# Patient Record
Sex: MALE | Race: WHITE | Employment: FULL TIME | URBAN - METROPOLITAN AREA
[De-identification: names, ages, dates, MRNs, and addresses within clinical notes are randomized per-mention and may not be internally consistent; named-entity substitution may affect disease eponyms.]

---

## 2017-04-25 ENCOUNTER — OFFICE VISIT (OUTPATIENT)
Dept: FAMILY MEDICINE | Facility: CLINIC | Age: 27
End: 2017-04-25
Payer: COMMERCIAL

## 2017-04-25 VITALS
HEART RATE: 89 BPM | DIASTOLIC BLOOD PRESSURE: 82 MMHG | BODY MASS INDEX: 26.39 KG/M2 | TEMPERATURE: 98.5 F | OXYGEN SATURATION: 98 % | HEIGHT: 71 IN | SYSTOLIC BLOOD PRESSURE: 138 MMHG | WEIGHT: 188.5 LBS

## 2017-04-25 DIAGNOSIS — Z00.00 ROUTINE GENERAL MEDICAL EXAMINATION AT A HEALTH CARE FACILITY: Primary | ICD-10-CM

## 2017-04-25 DIAGNOSIS — N52.9 ERECTILE DYSFUNCTION, UNSPECIFIED ERECTILE DYSFUNCTION TYPE: ICD-10-CM

## 2017-04-25 DIAGNOSIS — F41.1 GAD (GENERALIZED ANXIETY DISORDER): ICD-10-CM

## 2017-04-25 PROCEDURE — 99385 PREV VISIT NEW AGE 18-39: CPT | Performed by: NURSE PRACTITIONER

## 2017-04-25 PROCEDURE — 99213 OFFICE O/P EST LOW 20 MIN: CPT | Mod: 25 | Performed by: NURSE PRACTITIONER

## 2017-04-25 RX ORDER — BUSPIRONE HYDROCHLORIDE 10 MG/1
10 TABLET ORAL 2 TIMES DAILY
Qty: 60 TABLET | Refills: 1 | Status: SHIPPED | OUTPATIENT
Start: 2017-04-25 | End: 2017-05-31 | Stop reason: DRUGHIGH

## 2017-04-25 RX ORDER — TADALAFIL 20 MG/1
10-20 TABLET ORAL DAILY PRN
Qty: 6 TABLET | Refills: 11 | Status: SHIPPED | OUTPATIENT
Start: 2017-04-25 | End: 2017-04-25

## 2017-04-25 RX ORDER — TADALAFIL 20 MG/1
10-20 TABLET ORAL DAILY PRN
Qty: 6 TABLET | Refills: 11 | Status: SHIPPED | OUTPATIENT
Start: 2017-04-25 | End: 2021-11-18

## 2017-04-25 ASSESSMENT — ANXIETY QUESTIONNAIRES
5. BEING SO RESTLESS THAT IT IS HARD TO SIT STILL: NOT AT ALL
7. FEELING AFRAID AS IF SOMETHING AWFUL MIGHT HAPPEN: SEVERAL DAYS
IF YOU CHECKED OFF ANY PROBLEMS ON THIS QUESTIONNAIRE, HOW DIFFICULT HAVE THESE PROBLEMS MADE IT FOR YOU TO DO YOUR WORK, TAKE CARE OF THINGS AT HOME, OR GET ALONG WITH OTHER PEOPLE: VERY DIFFICULT
1. FEELING NERVOUS, ANXIOUS, OR ON EDGE: NEARLY EVERY DAY
6. BECOMING EASILY ANNOYED OR IRRITABLE: NEARLY EVERY DAY
2. NOT BEING ABLE TO STOP OR CONTROL WORRYING: NEARLY EVERY DAY
GAD7 TOTAL SCORE: 16
3. WORRYING TOO MUCH ABOUT DIFFERENT THINGS: NEARLY EVERY DAY

## 2017-04-25 ASSESSMENT — PATIENT HEALTH QUESTIONNAIRE - PHQ9: 5. POOR APPETITE OR OVEREATING: NEARLY EVERY DAY

## 2017-04-25 NOTE — PROGRESS NOTES
"  SUBJECTIVE:     CC: Juan Johnson is an 26 year old male who presents for preventative health visit.     Healthy Habits:    Do you get at least three servings of calcium containing foods daily (dairy, green leafy vegetables, etc.)? yes    Amount of exercise or daily activities, outside of work: 2 day(s) per week    Problems taking medications regularly not applicable    Medication side effects: No    Have you had an eye exam in the past two years? yes    Do you see a dentist twice per year? no    Do you have sleep apnea, excessive snoring or daytime drowsiness?yes- some daytime drowsiness     Abnormal Mood Symptoms     Onset: Since the age of 15 or 16    Description:   Depression: YES  Anxiety: YES- panic attacks    Accompanying Signs & Symptoms:  Still participating in activities that you used to enjoy: \"barely\"  Fatigue: YES  Irritability: YES  Difficulty concentrating: YES  Changes in appetite: YES  Problems with sleep: YES- some nights- depending on how the day was.   Heart racing/beating fast : YES- during panic attacks   Thoughts of hurting yourself or others: none     History:   Recent stress: YES  Prior depression hospitalization: None- outpatient program  Family history of depression: YES- older brother  Family history of anxiety: YES- older brother      Precipitating factors:   Alcohol/drug use: no- has tried cutting back on alcohol- did not notice a change in symptoms    Alleviating factors:  None        Therapies Tried and outcome: Celexa (Citalopram), Effexor XR (Venafaxine), Prozac (Fluoxetine) and Zoloft (Sertraline), thinks there were a few other ones but cannot remember names- none helpful. Ativan has been helpful in the past w/ school.             -------------------------------------    Today's PHQ-2 Score: No flowsheet data found.    Abuse: Current or Past(Physical, Sexual or Emotional)- No  Do you feel safe in your environment - Yes    Social History   Substance Use Topics     Smoking " status: Current Every Day Smoker     Packs/day: 0.25     Years: 4.00     Types: Cigarettes     Smokeless tobacco: Not on file     Alcohol use Yes      Comment: about 2 per day or 8 per week          Standardized Alcohol Screening Questionnaire  AUDIT   Questions 0 1 2 3 4 Score   1. How often do you have a drink  containing alcohol? Never Monthly or less 2 to 4  times a  month 2 to 3  times a  week 4 or more  times a  week  3   2. How many drinks containing alcohol  do you have on a typical day when you are drinking? 1 or 2 3 or 4 5 or 6 7 to 9 10 or more  0   3. How often do you have more than five  or more drinks on one occasion? Never Less  than  monthly Monthly Weekly Daily or  almost  daily  1   4. How often during the last year have  you found that you were not able to stop drinking once you had started? Never Less  than  monthly Monthly Weekly Daily or  almost  daily  0   5. How often during the last year have  you failed to do what was normally expected of you because of drinking? Never Less  than  monthly Monthly Weekly Daily or  almost  daily  0   6. How often during the last year have  you needed a first drink in the morning to get yourself going after a heavy drinking session? Never Less  than  monthly Monthly Weekly Daily or  almost  daily  0   7. How often during the last year have you had a feeling of guilt or remorse after drinking? Never Less  than  monthly Monthly Weekly Daily or  almost  daily  1   8. How often during the last year have  you been unable to remember what happened the night before because of your drinking? Never Less  than  monthly Monthly Weekly Daily or  almost  daily  0   9. Have you or someone else been  injured because of your drinking? No  Yes, but not in the last year  Yes,  during the  last year  0   10. Has a relative, friend, doctor or other health care worker been concerned about your drinking or suggested you cut down? No  Yes, but not in the last year  Yes,  during  the  last year  0   Total  5   Scoring: A score of 7 for adult men is an indication of hazardous drinking (risk for physical or physiological harm); a score of 8 or more is an indication of an alcohol use disorder. A score of 5 or more for adult women  is an indication of hazardous drinking or an alcohol use disorder.       Last PSA: No results found for: PSA    No results for input(s): CHOL, HDL, LDL, TRIG, CHOLHDLRATIO, NHDL in the last 98751 hours.    Reviewed orders with patient. Reviewed health maintenance and updated orders accordingly - Yes    Reviewed and updated as needed this visit by clinical staff  Tobacco  Allergies  Meds  Med Hx  Surg Hx  Fam Hx  Soc Hx        Reviewed and updated as needed this visit by Provider            ROS:  C: NEGATIVE for fever, chills, change in weight  I: NEGATIVE for worrisome rashes, moles or lesions  E: NEGATIVE for vision changes or irritation  ENT: NEGATIVE for ear, mouth and throat problems  R: NEGATIVE for significant cough or SOB  CV: NEGATIVE for chest pain, palpitations or peripheral edema  GI: NEGATIVE for nausea, abdominal pain, heartburn, or change in bowel habits   male: negative for dysuria, hematuria, decreased urinary stream, erectile dysfunction, urethral discharge  M: NEGATIVE for significant arthralgias or myalgia  N: NEGATIVE for weakness, dizziness or paresthesias  P: NEGATIVE for changes in mood or affect    Problem list, Medication list, Allergies, and Medical/Social/Surgical histories reviewed in [facility] and updated as appropriate.  Labs reviewed in EPIC  OBJECTIVE:     There were no vitals taken for this visit.  EXAM:  GENERAL: healthy, alert and no distress  HENT: ear canals and TM's normal, nose and mouth without ulcers or lesions  NECK: no adenopathy, no asymmetry, masses, or scars and thyroid normal to palpation  RESP: lungs clear to auscultation - no rales, rhonchi or wheezes  CV: regular rate and rhythm, normal S1 S2, no S3 or S4, no  murmur, click or rub, no peripheral edema and peripheral pulses strong  : normal penis and testicles, no atrophy noted in testicles  ABDOMEN: soft, nontender, no hepatosplenomegaly, no masses and bowel sounds normal  MS: no gross musculoskeletal defects noted, no edema  SKIN: no suspicious lesions or rashes  PSYCH: mentation appears normal, affect normal/bright    ASSESSMENT/PLAN:         ICD-10-CM    1. Routine general medical examination at a health care facility Z00.00    2. LAM (generalized anxiety disorder) F41.1 MENTAL HEALTH REFERRAL     busPIRone (BUSPAR) 10 MG tablet   3. Erectile dysfunction, unspecified erectile dysfunction type N52.9 tadalafil (CIALIS) 20 MG tablet     DISCONTINUED: tadalafil (CIALIS) 20 MG tablet   Will place counseling referral, and start Buspar predominantly for anxiety symptoms. Will give small quantity of cialis to help  With likely transitory ED related to anxiety symptoms, as these are very bothersome for the patient.    COUNSELING:  Reviewed preventive health counseling, as reflected in patient instructions       Regular exercise       Healthy diet/nutrition         reports that he has been smoking Cigarettes.  He has a 1.00 pack-year smoking history. He does not have any smokeless tobacco history on file.    There is no height or weight on file to calculate BMI.   Weight management plan n/a    Counseling Resources:  ATP IV Guidelines  Pooled Cohorts Equation Calculator  FRAX Risk Assessment  ICSI Preventive Guidelines  Dietary Guidelines for Americans, 2010  USDA's MyPlate  ASA Prophylaxis  Lung CA Screening    CARLYLE Rand CNP  [facility]

## 2017-04-25 NOTE — MR AVS SNAPSHOT
After Visit Summary   4/25/2017    Juan Johnson    MRN: [id number]           Patient Information     Date Of Birth          1990        Visit Information        Provider Department      4/25/2017 3:30 PM Cathi Montes De Oca APRN [facility]        Today's Diagnoses     Routine general medical examination at a health care facility    -  1    LAM (generalized anxiety disorder)        Erectile dysfunction, unspecified erectile dysfunction type          Care Instructions      Preventive Health Recommendations  Male Ages 26 - 39    Yearly exam:             See your health care provider every year in order to  o   Review health changes.   o   Discuss preventive care.    o   Review your medicines if your doctor has prescribed any.    You should be tested each year for STDs (sexually transmitted diseases), if you re at risk.     After age 35, talk to your provider about cholesterol testing. If you are at risk for heart disease, have your cholesterol tested at least every 5 years.     If you are at risk for diabetes, you should have a diabetes test (fasting glucose).  Shots: Get a flu shot each year. Get a tetanus shot every 10 years.     Nutrition:    Eat at least 5 servings of fruits and vegetables daily.     Eat whole-grain bread, whole-wheat pasta and brown rice instead of white grains and rice.     Talk to your provider about Calcium and Vitamin D.     Lifestyle    Exercise for at least 150 minutes a week (30 minutes a day, 5 days a week). This will help you control your weight and prevent disease.     Limit alcohol to one drink per day.     No smoking.     Wear sunscreen to prevent skin cancer.     See your dentist every six months for an exam and cleaning.           Follow-ups after your visit        Additional Services     MENTAL HEALTH REFERRAL       Your provider has referred you to: [facility]: [facility] - Counseling (Individual/Couples/Family) - [facility] [phone]   [email or web address]   *Patient will be contacted by [facility] scheduling partner, [facility]), to schedule an appointment.  Patients may also call [facility] to schedule.  [facility]: [facility] - [facility] [phone]   [email or web address]/   *Referral from [facility] Primary Care Provider required - Consultation Model - medication management & future refills will be returned to [facility] PCP upon completion of evaluation  *Please call to schedule an appointment.    All scheduling is subject to the client's specific insurance plan & benefits, provider/location availability, and provider clinical specialities.  Please arrive 15 minutes early for your first appointment and bring your completed paperwork.    Please be aware that coverage of these services is subject to the terms and limitations of your health insurance plan.  Call member services at your health plan with any benefit or coverage questions.                  Who to contact     If you have questions or need follow up information about today's clinic visit or your schedule please contact [facility] directly at [phone].  Normal or non-critical lab and imaging results will be communicated to you by MyChart, letter or phone within 4 business days after the clinic has received the results. If you do not hear from us within 7 days, please contact the clinic through [facility] or phone. If you have a critical or abnormal lab result, we will notify you by phone as soon as possible.  Submit refill requests through [facility] or call your pharmacy and they will forward the refill request to us. Please allow 3 business days for your refill to be completed.          Additional Information About Your Visit        MyChart Information   "   Issue lets you send messages to your doctor, view your test results, renew your prescriptions, schedule appointments and more. To sign up, go to [email or web address] . Click on \"Log in\" on the left side of the screen, which will take you to the Welcome page. Then click on \"Sign up Now\" on the right side of the page.     You will be asked to enter the access code listed below, as well as some personal information. Please follow the directions to create your username and password.     Your access code is: [email or web address]  Expires: 2017  3:00 PM     Your access code will  in 90 days. If you need help or a new code, please call your [facility] clinic or [phone].        Care EveryWhere ID     This is your Care EveryWhere ID. This could be used by other organizations to access your [facility] medical records  [id number]        Your Vitals Were     Pulse Temperature Height Pulse Oximetry BMI (Body Mass Index)       89 98.5  F (36.9  C) (Oral) 5' 11.25\" (1.81 m) 98% 26.11 kg/m2        Blood Pressure from Last 3 Encounters:   17 142/84    Weight from Last 3 Encounters:   17 188 lb 8 oz (85.5 kg)              We Performed the Following     MENTAL HEALTH REFERRAL          Today's Medication Changes          These changes are accurate as of: 17  3:56 PM.  If you have any questions, ask your nurse or doctor.               Start taking these medicines.        Dose/Directions    busPIRone 10 MG tablet   Commonly known as:  BUSPAR   Used for:  LAM (generalized anxiety disorder)   Started by:  Cathi Montes De Oca APRN CNP        Dose:  10 mg   Take 1 tablet (10 mg) by mouth 2 times daily   Quantity:  60 tablet   Refills:  1       tadalafil 20 MG tablet   Commonly known as:  CIALIS   Used for:  Erectile dysfunction, unspecified erectile dysfunction type   Started by:  Cathi Montes De Oca APRN CNP        Dose:  10-20 mg   Take 0.5-1 tablets (10-20 mg) by mouth daily as needed for erectile " dysfunction Never use with nitroglycerin, terazosin or doxazosin.   Quantity:  6 tablet   Refills:  11            Where to get your medicines      These medications were sent to [facility]/pharmacy #[id number] - [address] AT CORNER OF [address] [place] [address]     Phone:  [phone]     busPIRone 10 MG tablet         Call your pharmacy to confirm that your medication is ready for pickup. It may take up to 24 hours for them to receive the prescription. If the prescription is not ready within 3 business days, please contact your clinic or your provider.     We will let you know when these medications are ready. If you don't hear back within 3 business days, please contact us.     tadalafil 20 MG tablet                Primary Care Provider    None Specified       No primary provider on file.        Thank you!     Thank you for choosing [facility]  for your care. Our goal is always to provide you with excellent care. Hearing back from our patients is one way we can continue to improve our services. Please take a few minutes to complete the written survey that you may receive in the mail after your visit with us. Thank you!             Your Updated Medication List - Protect others around you: Learn how to safely use, store and throw away your medicines at www.disposemymeds.org.          This list is accurate as of: 4/25/17  3:56 PM.  Always use your most recent med list.                   Brand Name Dispense Instructions for use    busPIRone 10 MG tablet    BUSPAR    60 tablet    Take 1 tablet (10 mg) by mouth 2 times daily       tadalafil 20 MG tablet    CIALIS    6 tablet    Take 0.5-1 tablets (10-20 mg) by mouth daily as needed for erectile dysfunction Never use with nitroglycerin, terazosin or doxazosin.

## 2017-04-25 NOTE — NURSING NOTE
"Chief Complaint   Patient presents with     Physical     Hypertension     Anxiety       Initial /84 (BP Location: Right arm, Patient Position: Chair, Cuff Size: Adult Large)  Pulse 89  Temp 98.5  F (36.9  C) (Oral)  Ht 5' 11.25\" (1.81 m)  Wt 188 lb 8 oz (85.5 kg)  SpO2 98%  BMI 26.11 kg/m2 Estimated body mass index is 26.11 kg/(m^2) as calculated from the following:    Height as of this encounter: 5' 11.25\" (1.81 m).    Weight as of this encounter: 188 lb 8 oz (85.5 kg).  Medication Reconciliation: complete     Porsche Davalos CMA    "

## 2017-04-26 ASSESSMENT — PATIENT HEALTH QUESTIONNAIRE - PHQ9: SUM OF ALL RESPONSES TO PHQ QUESTIONS 1-9: 19

## 2017-04-26 ASSESSMENT — ANXIETY QUESTIONNAIRES: GAD7 TOTAL SCORE: 16

## 2017-05-16 ENCOUNTER — OFFICE VISIT (OUTPATIENT)
Dept: FAMILY MEDICINE | Facility: CLINIC | Age: 27
End: 2017-05-16
Payer: COMMERCIAL

## 2017-05-16 VITALS
TEMPERATURE: 98.1 F | WEIGHT: 185.8 LBS | BODY MASS INDEX: 25.73 KG/M2 | SYSTOLIC BLOOD PRESSURE: 132 MMHG | DIASTOLIC BLOOD PRESSURE: 86 MMHG | OXYGEN SATURATION: 97 % | HEART RATE: 74 BPM

## 2017-05-16 DIAGNOSIS — F41.1 GAD (GENERALIZED ANXIETY DISORDER): Primary | ICD-10-CM

## 2017-05-16 PROCEDURE — 99214 OFFICE O/P EST MOD 30 MIN: CPT | Performed by: NURSE PRACTITIONER

## 2017-05-16 RX ORDER — BUSPIRONE HYDROCHLORIDE 15 MG/1
15 TABLET ORAL 2 TIMES DAILY
Qty: 60 TABLET | Refills: 3 | Status: SHIPPED | OUTPATIENT
Start: 2017-05-16 | End: 2021-11-18

## 2017-05-16 RX ORDER — GABAPENTIN 100 MG/1
100 CAPSULE ORAL 3 TIMES DAILY
Qty: 45 CAPSULE | Refills: 1 | Status: SHIPPED | OUTPATIENT
Start: 2017-05-16 | End: 2021-11-18

## 2017-05-16 NOTE — NURSING NOTE
"Chief Complaint   Patient presents with     RECHECK       Initial /86 (BP Location: Right arm, Patient Position: Chair, Cuff Size: Adult Large)  Pulse 74  Temp 98.1  F (36.7  C) (Oral)  Wt 185 lb 12.8 oz (84.3 kg)  SpO2 97%  BMI 25.73 kg/m2 Estimated body mass index is 25.73 kg/(m^2) as calculated from the following:    Height as of 4/25/17: 5' 11.25\" (1.81 m).    Weight as of this encounter: 185 lb 12.8 oz (84.3 kg).  Medication Reconciliation: complete     Porsche Davalos CMA    "

## 2017-05-16 NOTE — MR AVS SNAPSHOT
"              After Visit Summary   5/16/2017    Juan Johnson    MRN: [id number]           Patient Information     Date Of Birth          1990        Visit Information        Provider Department      5/16/2017 3:00 PM Cathi Montes De Oca APRN [facility]        Today's Diagnoses     LAM (generalized anxiety disorder)    -  1       Follow-ups after your visit        Your next 10 appointments already scheduled     May 17, 2017  3:00 PM CDT   New Visit with MARIAN Patel   [facility]    [address] [place] [address] [phone]            May 31, 2017  2:45 PM CDT   New Visit with CARLYLE Correa [facility]    [address] [facility] [address] [address] [phone]              Who to contact     If you have questions or need follow up information about today's clinic visit or your schedule please contact [facility] directly at [phone].  Normal or non-critical lab and imaging results will be communicated to you by [facility], letter or phone within 4 business days after the clinic has received the results. If you do not hear from us within 7 days, please contact the clinic through [facility] or phone. If you have a critical or abnormal lab result, we will notify you by phone as soon as possible.  Submit refill requests through [facility] or call your pharmacy and they will forward the refill request to us. Please allow 3 business days for your refill to be completed.          Additional Information About Your Visit        MyChart Information     [facility] lets you send messages to your doctor, view your test results, renew your prescriptions, schedule appointments and more. To sign up, go to [email or web address] . Click on \"Log in\" on the left side of the screen, which will take you to the Welcome page. Then click on " "\"Sign up Now\" on the right side of the page.     You will be asked to enter the access code listed below, as well as some personal information. Please follow the directions to create your username and password.     Your access code is: [email or web address]  Expires: 2017  3:00 PM     Your access code will  in 90 days. If you need help or a new code, please call your [place] clinic or [phone].        Care EveryWhere ID     This is your Care EveryWhere ID. This could be used by other organizations to access your [place] medical records  [id number]        Your Vitals Were     Pulse Temperature Pulse Oximetry BMI (Body Mass Index)          74 98.1  F (36.7  C) (Oral) 97% 25.73 kg/m2         Blood Pressure from Last 3 Encounters:   17 132/86   17 138/82    Weight from Last 3 Encounters:   17 185 lb 12.8 oz (84.3 kg)   17 188 lb 8 oz (85.5 kg)              Today, you had the following     No orders found for display         Today's Medication Changes          These changes are accurate as of: 17  3:12 PM.  If you have any questions, ask your nurse or doctor.               Start taking these medicines.        Dose/Directions    gabapentin 100 MG capsule   Commonly known as:  NEURONTIN   Used for:  LAM (generalized anxiety disorder)        Dose:  100 mg   Take 1 capsule (100 mg) by mouth 3 times daily   Quantity:  45 capsule   Refills:  1         These medicines have changed or have updated prescriptions.        Dose/Directions    * busPIRone 10 MG tablet   Commonly known as:  BUSPAR   This may have changed:  Another medication with the same name was added. Make sure you understand how and when to take each.   Used for:  LAM (generalized anxiety disorder)        Dose:  10 mg   Take 1 tablet (10 mg) by mouth 2 times daily   Quantity:  60 tablet   Refills:  1       * busPIRone 15 MG tablet   Commonly known as:  BUSPAR   This may have changed:  You were already taking a medication with " the same name, and this prescription was added. Make sure you understand how and when to take each.   Used for:  LAM (generalized anxiety disorder)        Dose:  15 mg   Take 1 tablet (15 mg) by mouth 2 times daily   Quantity:  60 tablet   Refills:  3       * Notice:  This list has 2 medication(s) that are the same as other medications prescribed for you. Read the directions carefully, and ask your doctor or other care provider to review them with you.         Where to get your medicines      These medications were sent to [facility]/pharmacy #[id number] - [address] AT COR[address] OF [address] [place] [address]     Phone:  [phone]     busPIRone 15 MG tablet    gabapentin 100 MG capsule                Primary Care Provider    None Specified       No primary provider on file.        Thank you!     Thank you for choosing [facility]  for your care. Our goal is always to provide you with excellent care. Hearing back from our patients is one way we can continue to improve our services. Please take a few minutes to complete the written survey that you may receive in the mail after your visit with us. Thank you!             Your Updated Medication List - Protect others around you: Learn how to safely use, store and throw away your medicines at www.disposemymeds.org.          This list is accurate as of: 5/16/17  3:12 PM.  Always use your most recent med list.                   Brand Name Dispense Instructions for use    * busPIRone 10 MG tablet    BUSPAR    60 tablet    Take 1 tablet (10 mg) by mouth 2 times daily       * busPIRone 15 MG tablet    BUSPAR    60 tablet    Take 1 tablet (15 mg) by mouth 2 times daily       gabapentin 100 MG capsule    NEURONTIN    45 capsule    Take 1 capsule (100 mg) by mouth 3 times daily       tadalafil 20 MG tablet    CIALIS    6 tablet    Take 0.5-1 tablets (10-20 mg) by mouth daily as needed for erectile dysfunction Never use with  nitroglycerin, terazosin or doxazosin.       * Notice:  This list has 2 medication(s) that are the same as other medications prescribed for you. Read the directions carefully, and ask your doctor or other care provider to review them with you.

## 2017-05-16 NOTE — PROGRESS NOTES
SUBJECTIVE:                                                    Juan Johnson is a 26 year old male who presents to clinic today for the following health issues:    Anxiety Follow-Up    Status since last visit: Symptoms are the same as last visit, sleeping a bit better, not waking up as frequently, has occasional panic attacks    Other associated symptoms: None    Complicating factors:   Significant life event: Yes-  Grandmother  about 2 months ago, still trying to figure out his next career step when his [employer] program ends.    Current substance abuse: None  Depression symptoms: Yes  LAM-7 SCORE 2017   Total Score 16        GAD7       Amount of exercise or physical activity: 2-3 days/week for an average of 30-60 minutes    Problems taking medications regularly: No    Medication side effects: upset stomach and headache at first    Diet: regular (no restrictions)      -------------------------------------    Problem list and histories reviewed & adjusted, as indicated.  Additional history: as documented    There is no problem list on file for this patient.    No past surgical history on file.    Social History   Substance Use Topics     Smoking status: Current Every Day Smoker     Packs/day: 0.25     Years: 4.00     Types: Cigarettes     Smokeless tobacco: Not on file     Alcohol use Yes      Comment: about 2 per day or 8 per week     Family History   Problem Relation Age of Onset     Depression Brother      Anxiety Disorder Brother            Reviewed and updated as needed this visit by clinical staff  Allergies  Meds       Reviewed and updated as needed this visit by Provider         ROS:  Constitutional, HEENT, cardiovascular, pulmonary, gi and gu systems are negative, except as otherwise noted.    OBJECTIVE:                                                    /86 (BP Location: Right arm, Patient Position: Chair, Cuff Size: Adult Large)  Pulse 74  Temp 98.1  F (36.7  C) (Oral)  Wt  185 lb 12.8 oz (84.3 kg)  SpO2 97%  BMI 25.73 kg/m2  Body mass index is 25.73 kg/(m^2).  GENERAL: healthy, alert and no distress  NECK: no adenopathy, no asymmetry, masses, or scars and thyroid normal to palpation  RESP: lungs clear to auscultation - no rales, rhonchi or wheezes  CV: regular rate and rhythm, normal S1 S2, no S3 or S4, no murmur, click or rub, no peripheral edema and peripheral pulses strong  MS: no gross musculoskeletal defects noted, no edema  PSYCH: mentation appears normal, affect normal/bright    Diagnostic Test Results:  none      ASSESSMENT/PLAN:                                                      Problem List Items Addressed This Visit     None      Visit Diagnoses     LAM (generalized anxiety disorder)    -  Primary    Relevant Medications    busPIRone (BUSPAR) 15 MG tablet    gabapentin (NEURONTIN) 100 MG capsule       Follow up with Psychiatry in the next few weeks, then with me in October as needed  CARLY[facility]  Please note greater than 50% of this 30 minute appointment were spent in counseling with the patient of the issues described above in the history of present illness and in the plan, including changing medications

## 2017-05-17 ENCOUNTER — OFFICE VISIT (OUTPATIENT)
Dept: PSYCHOLOGY | Facility: CLINIC | Age: 27
End: 2017-05-17
Attending: NURSE PRACTITIONER
Payer: COMMERCIAL

## 2017-05-17 DIAGNOSIS — F41.1 GAD (GENERALIZED ANXIETY DISORDER): Primary | ICD-10-CM

## 2017-05-17 DIAGNOSIS — F33.0 MAJOR DEPRESSIVE DISORDER, RECURRENT EPISODE, MILD WITH ANXIOUS DISTRESS (H): ICD-10-CM

## 2017-05-17 PROCEDURE — 90791 PSYCH DIAGNOSTIC EVALUATION: CPT | Performed by: SOCIAL WORKER

## 2017-05-17 ASSESSMENT — ANXIETY QUESTIONNAIRES
IF YOU CHECKED OFF ANY PROBLEMS ON THIS QUESTIONNAIRE, HOW DIFFICULT HAVE THESE PROBLEMS MADE IT FOR YOU TO DO YOUR WORK, TAKE CARE OF THINGS AT HOME, OR GET ALONG WITH OTHER PEOPLE: SOMEWHAT DIFFICULT
7. FEELING AFRAID AS IF SOMETHING AWFUL MIGHT HAPPEN: SEVERAL DAYS
GAD7 TOTAL SCORE: 11
5. BEING SO RESTLESS THAT IT IS HARD TO SIT STILL: NOT AT ALL
3. WORRYING TOO MUCH ABOUT DIFFERENT THINGS: MORE THAN HALF THE DAYS
1. FEELING NERVOUS, ANXIOUS, OR ON EDGE: NEARLY EVERY DAY
2. NOT BEING ABLE TO STOP OR CONTROL WORRYING: MORE THAN HALF THE DAYS
6. BECOMING EASILY ANNOYED OR IRRITABLE: MORE THAN HALF THE DAYS

## 2017-05-17 ASSESSMENT — PATIENT HEALTH QUESTIONNAIRE - PHQ9: 5. POOR APPETITE OR OVEREATING: SEVERAL DAYS

## 2017-05-17 NOTE — MR AVS SNAPSHOT
"                  MRN:[id number]                      After Visit Summary   2017    Juan Johnson    MRN: [id number]           Visit Information        Provider Department      2017 3:00 PM Yoseph Nixon LICSW [facility]      Your next 10 appointments already scheduled     May 31, 2017  2:45 PM CDT   [facility]    [address] [address]   [phone]            2017  3:00 PM CDT   Return Visit with Yoseph GONZALEZ Antwanarnelsebastian [facility] ([facility])    [address] [address]   [phone]            2017  3:00 PM CDT   Return Visit with Yoseph LISA Hussain [facility] ([facility])    [address] [address] [phone]              MyChart Information     [employer]-bites lets you send messages to your doctor, view your test results, renew your prescriptions, schedule appointments and more. To sign up, go to [email or web address] . Click on \"Log in\" on the left side of the screen, which will take you to the Welcome page. Then click on \"Sign up Now\" on the right side of the page.     You will be asked to enter the access code listed below, as well as some personal information. Please follow the directions to create your username and password.     Your access code is: [email or web address]  Expires: 2017  3:00 PM     Your access code will  in 90 days. If you need help or a new code, please call your [facility] or [phone].        Care EveryWhere ID     This is your Care EveryWhere ID. This could be used by other organizations to access your [facility] [facility] medical records  [id number]        "

## 2017-05-18 PROBLEM — F33.0 MAJOR DEPRESSIVE DISORDER, RECURRENT EPISODE, MILD WITH ANXIOUS DISTRESS (H): Status: ACTIVE | Noted: 2017-05-18

## 2017-05-18 PROBLEM — F41.1 GAD (GENERALIZED ANXIETY DISORDER): Status: ACTIVE | Noted: 2017-05-18

## 2017-05-18 ASSESSMENT — ANXIETY QUESTIONNAIRES: GAD7 TOTAL SCORE: 11

## 2017-05-18 ASSESSMENT — PATIENT HEALTH QUESTIONNAIRE - PHQ9: SUM OF ALL RESPONSES TO PHQ QUESTIONS 1-9: 9

## 2017-05-18 NOTE — PROGRESS NOTES
Adult Intake Structured Interview  Standard Diagnostic Assessment      CLIENT'S NAME: Juan Johnson  MRN:   [id number]  :   1990  ACCT. NUMBER: [id number]  DATE OF SERVICE: 17      Identifying Information:  Client is a 26 year old, , single male. Client was referred for counseling by doctor at primary care clinic. Client is currently employed part time. Client attended the session alone.       Client's Statement of Presenting Concern:  Client reports the reason for seeking therapy at this time as increased anxiety and depression especially social anxiety.   Client stated that his symptoms have resulted in the following functional impairments: money management, relationship(s), self-care, social interactions and work / vocational responsibilities      History of Presenting Concern:  Client reports that these problem(s) began approximately 9 years ago.  Client has attempted to resolve these concerns in the past through counseling. Client reports that other professional(s) are not involved in providing support / services.       Social History:  Client reported he grew up in [place], [place]. They were the second born of 4 children. This is an intact family and parents remain . Client reported that his childhood was normal.  Client did have a speech impediment up until the 7th grade which did impact his self esteem and self confidence.  Client described his current relationships with family of origin as good all of his family members are cordial with each other but he would not describe the relationships he has with family as close.     Client reported a history of 2 committed relationships or marriages. Longest relationship lasted for 3 years and his current relationship he has been in for 4 months. Client has been single  for 27 years. Client reported having 0 children. Client identified few stable and meaningful social connections. Client reported that he has not been involved with the legal system.  Client's highest education level was college graduate. Client did identify the following learning problems: attention and concentration. There are no ethnic, cultural or Gnosticism factors that may be relevant for therapy. Client identified his preferred language to be English. Client reported he does not need the assistance of an  or other support involved in therapy. Modifications will not be used to assist communication in therapy. Client did not serve in the .     Client reports family history includes Anxiety Disorder in his brother; Depression in his brother.    Mental Health History:  Client reported the following biological family members or relatives with mental health issues: Brother experienced ADHD, Anxiety, Bipolar Disorder and Depression.  Client previously received the following mental health diagnosis: Anxiety and Depression.  Client has received the following mental health services in the past: counseling, medication(s) from physician / PCP and outpatient mental health group tx services.  Hospitalizations: None.  Client is not currently receiving any mental health services.      Chemical Health History:  Client reported the following biological family members or relatives with chemical health issues: Maternal Grandmother reportedly uses alcohol , Maternal Grandfather reportedly uses alcohol . Client has not received chemical dependency treatment in the past. Client is not currently receiving any chemical dependency treatment. Client reports no problems as a result of their drinking / drug use.      Client Reports:  Client reports using alcohol 3 times per week and has 3 beers, glasses of wine and mixed drinks at a time. Client first started drinking at age 15.  Client reports using tobacco 2 times per  day. Client started using tobacco at age 17.  Client denies using marijuana.  Client reports using caffeine 1 times per day and drinks 1 at a time. Client started using caffeine at age 15.  Client denies using street drugs.  Client denies the non-medical use of prescription or over the counter drugs.    CAGE: C     Patient felt they ought to CUT down on your drinking (or drug use).  E     Patient had a drink (or drug use) as an EYE OPENER first thing in the morning to steady his/her nerves, get the day started, or get rid of a hangover.   Based on the positive Cage-Aid score and clinical interview there  are indications of drug or alcohol abuse. Recommendation for substance abuse disorder evaluation with a substance use professional was given. Therapist did recommend client to reduce use or abstain from alcohol or substance use. Therapist did recommend structured treatment and or community support (AA, 12 step group, etc.). We will continue to check in on alcohol use and discuss options for decreasing it or tx options. .    Discussed the general effects of drugs and alcohol on health and well-being. Therapist gave client printed information about the effects of chemical use on his health and well being.      Significant Losses / Trauma / Abuse / Neglect Issues:  There are indications or report of significant loss, trauma, abuse or neglect issues related to: death of grandmother in March 2017.    Issues of possible neglect are not present.      Medical Issues:  Client has had a physical exam to rule out medical causes for current symptoms. Date of last physical exam was within the past year. Client was encouraged to follow up with PCP if symptoms were to develop. The client has a [facility], he has not established a primary care physician but is hoping to in the near future. The client reports not having a psychiatrist. He does have a scheduled appointment on May 31, 2017 to see a psychiatrist in  [facility].  Client reports no current medical concerns. The client denies the presence of chronic or episodic pain. There are not significant nutritional concerns.     Client reports current meds as:   Current Outpatient Prescriptions   Medication Sig     busPIRone (BUSPAR) 15 MG tablet Take 1 tablet (15 mg) by mouth 2 times daily     gabapentin (NEURONTIN) 100 MG capsule Take 1 capsule (100 mg) by mouth 3 times daily     busPIRone (BUSPAR) 10 MG tablet Take 1 tablet (10 mg) by mouth 2 times daily     tadalafil (CIALIS) 20 MG tablet Take 0.5-1 tablets (10-20 mg) by mouth daily as needed for erectile dysfunction Never use with nitroglycerin, terazosin or doxazosin. (Patient not taking: Reported on 5/16/2017)     No current facility-administered medications for this visit.        Client Allergies:  No Known Allergies  no known allergies to medications    Medical History:  No past medical history on file.      Medication Adherence:  Client reports taking prescribed medications as prescribed.    Client was provided recommendation to follow-up with prescribing physician.    Mental Status Assessment:  Appearance:   Appropriate   Eye Contact:   Fair   Psychomotor Behavior: Restless   Attitude:   Cooperative   Orientation:   All  Speech   Rate / Production: Normal    Volume:  Normal   Mood:    Anxious  Depressed  Sad   Affect:    Blunted  Subdued  Worrisome   Thought Content:  Clear   Thought Form:  Coherent  Logical   Insight:    Fair       Review of Symptoms:  Depression: No symptoms Sleep Interest Energy Concentration Appetite Ruminations Irritability  Marian:  No symptoms  Psychosis: No symptoms  Anxiety: Worries Nervousness Describe: social anxiety   Panic:  No symptoms  Post Traumatic Stress Disorder: No symptoms  Obsessive Compulsive Disorder: No symptoms  Eating Disorder: No symptoms  Oppositional Defiant Disorder: No symptoms  ADD / ADHD: Attention concentration  Conduct Disorder: No symptoms        Safety Issues  and Plan for Safety and Risk Management:  Client denies a history of suicidal ideation, suicide attempts, self-injurious behavior, homicidal ideation, homicidal behavior and and other safety concerns  Client denies current fears or concerns for personal safety.  Client denies current or recent suicidal ideation or behaviors.  Client denies current or recent homicidal ideation or behaviors.  Client denies current or recent self injurious behavior or ideation.  Client denies other safety concerns.  Client reports there are firearms in the house. The firearms are secured in a locked space.  A safety and risk management plan has not been developed at this time, however client was given the after-hours number / 911 should there be a change in any of these risk factors.    Client's Strengths and Limitations:  Client identified the following strengths or resources that will help him succeed in counseling: friends / good social support, family support, intelligence and empathic, enjoys the outdoors, biking and cooking. Client identified the following supports: family and friends. Things that may interfere with the clients success in counseling include:concerned about ongoing insurance coverage.        Diagnostic Criteria:  A. Excessive anxiety and worry about a number of events or activities (such as work or school performance).   B. The person finds it difficult to control the worry.  C. Select 3 or more symptoms (required for diagnosis). Only one item is required in children.   - Restlessness or feeling keyed up or on edge.    - Being easily fatigued.    - Difficulty concentrating or mind going blank.    - Irritability.    - Muscle tension.    - Sleep disturbance (difficulty falling or staying asleep, or restless unsatisfying sleep).   E. The anxiety, worry, or physical symptoms cause clinically significant distress or impairment in social, occupational, or other important areas of functioning.   F. The disturbance is not  due to the direct physiological effects of a substance (e.g., a drug of abuse, a medication) or a general medical condition (e.g., hyperthyroidism) and does not occur exclusively during a Mood Disorder, a Psychotic Disorder, or a Pervasive Developmental Disorder.  A) Recurrent episode(s) - symptoms have been present during the same 2-week period and represent a change from previous functioning 5 or more symptoms (required for diagnosis)   - Depressed mood. Note: In children and adolescents, can be irritable mood.     - Diminished interest or pleasure in all, or almost all, activities.    - Decreased sleep.    - Psychomotor activity agitation.    - Fatigue or loss of energy.    - Feelings of worthlessness or inappropriate and excessive guilt.    - Diminished ability to think or concentrate, or indecisiveness.   B) The symptoms cause clinically significant distress or impairment in social, occupational, or other important areas of functioning  C) The episode is not attributable to the physiological effects of a substance or to another medical condition  D) The occurence of major depressive episode is not better explained by other thought / psychotic disorders  E) There has never been a manic episode or hypomanic episode      Functional Status:  Client's symptoms have caused reduced functional status in the following areas: Financial management financial stress  Occupational / Vocational - secure employment  Social / Relational - relationship with peers (social anxiety)      DSM5 Diagnoses: (Sustained by DSM5 Criteria Listed Above)  Diagnoses: 296.31 Major Depressive Disorder, Recurrent Episode, Mild _ and With anxious distress  300.02 (F41.1) Generalized Anxiety Disorder  Psychosocial & Contextual Factors: social anxiety, financial stress, difficulty making decisions, impulsive gambling, recent grief and loss issue  WHODAS 2.0 (12 item)            This questionnaire asks about difficulties due to health conditions.  Health conditions  include  disease or illnesses, other health problems that may be short or long lasting,  injuries, mental health or emotional problems, and problems with alcohol or drugs.                     Think back over the past 30 days and answer these questions, thinking about how much  difficulty you had doing the following activities. For each question, please [place] only  one response.    S1 Standing for long periods such as 30 minutes? None =         1   S2 Taking care of household responsibilities? None =         1   S3 Learning a new task, for example, learning how to get to a new place? None =         1   S4 How much of a problem do you have joining community activities (for example, festivals, Cheondoism or other activities) in the same way as anyone else can? Moderate =   3   S5 How much have you been emotionally affected by your health problems? Moderate =   3     In the past 30 days, how much difficulty did you have in:   S6 Concentrating on doing something for ten minutes? Severe =       4   S7 Walking a long distance such as a kilometer (or equivalent)? Mild =           2   S8 Washing your whole body? None =         1   S9 Getting dressed? None =         1   S10 Dealing with people you do not know? Moderate =   3   S11 Maintaining a friendship? Moderate =   3   S12 Your day to day work? Mild =           2     H1 Overall, in the past 30 days, how many days were these difficulties present? Record number of days 20   H2 In the past 30 days, for how many days were you totally unable to carry out your usual activities or work because of any health condition? Record number of days  0   H3 In the past 30 days, not counting the days that you were totally unable, for how many days did you cut back or reduce your usual activities or work because of any health condition? Record number of days 5     Attendance Agreement:  Client has signed Attendance Agreement:Yes      Preliminary Treatment Plan:  The  client reports no currently identified Congregation, ethnic or cultural issues relevant to therapy.     services are not indicated.    Modifications to assist communication are not indicated.    The concerns identified by the client will be addressed in therapy.    Initial Treatment will focus on: Depressed Mood - decrease depressive symptoms and return to normal daily functioning  Anxiety - Alleviate anxiety and return to normal daily functioning.    As a preliminary treatment goal, client will experience a reduction in depressed mood, will develop more effective coping skills to manage depressive symptoms, will develop healthy cognitive patterns and beliefs, will increase ability to function adaptively and will continue to take medications as prescribed / participate in supportive activities and services  and will experience a reduction in anxiety, will develop more effective coping skills to manage anxiety symptoms, will develop healthy cognitive patterns and beliefs and will increase ability to function adaptively.    The focus of initial interventions will be to alleviate anxiety, alleviate depressed mood, facilitate appropriate expression of feelings, increase coping skills, increase self esteem, provide homework to reinforce skill development and teach CBT skills.    Collaboration with other professionals is not indicated at this time.    Referral to another professional/service is not indicated at this time..    A Release of Information is not needed at this time.    Report to child / adult protection services was NA.    Client will have access to their [facility]' medical record.    Yoseph Nixon, [facility]  May 18, 2017

## 2017-05-31 ENCOUNTER — OFFICE VISIT (OUTPATIENT)
Dept: PSYCHIATRY | Facility: CLINIC | Age: 27
End: 2017-05-31
Payer: COMMERCIAL

## 2017-05-31 VITALS
SYSTOLIC BLOOD PRESSURE: 155 MMHG | WEIGHT: 185.8 LBS | HEART RATE: 90 BPM | DIASTOLIC BLOOD PRESSURE: 99 MMHG | BODY MASS INDEX: 25.73 KG/M2 | TEMPERATURE: 99 F

## 2017-05-31 DIAGNOSIS — F41.1 GAD (GENERALIZED ANXIETY DISORDER): ICD-10-CM

## 2017-05-31 DIAGNOSIS — F40.10 SOCIAL ANXIETY DISORDER: ICD-10-CM

## 2017-05-31 DIAGNOSIS — F33.1 MAJOR DEPRESSIVE DISORDER, RECURRENT EPISODE, MODERATE (H): Primary | ICD-10-CM

## 2017-05-31 PROCEDURE — 90792 PSYCH DIAG EVAL W/MED SRVCS: CPT | Performed by: CLINICAL NURSE SPECIALIST

## 2017-05-31 RX ORDER — BUPROPION HYDROCHLORIDE 150 MG/1
150 TABLET ORAL EVERY MORNING
Qty: 30 TABLET | Refills: 1 | Status: SHIPPED | OUTPATIENT
Start: 2017-05-31 | End: 2021-11-18

## 2017-05-31 ASSESSMENT — ANXIETY QUESTIONNAIRES
GAD7 TOTAL SCORE: 12
1. FEELING NERVOUS, ANXIOUS, OR ON EDGE: MORE THAN HALF THE DAYS
7. FEELING AFRAID AS IF SOMETHING AWFUL MIGHT HAPPEN: NOT AT ALL
5. BEING SO RESTLESS THAT IT IS HARD TO SIT STILL: SEVERAL DAYS
3. WORRYING TOO MUCH ABOUT DIFFERENT THINGS: MORE THAN HALF THE DAYS
2. NOT BEING ABLE TO STOP OR CONTROL WORRYING: MORE THAN HALF THE DAYS
IF YOU CHECKED OFF ANY PROBLEMS ON THIS QUESTIONNAIRE, HOW DIFFICULT HAVE THESE PROBLEMS MADE IT FOR YOU TO DO YOUR WORK, TAKE CARE OF THINGS AT HOME, OR GET ALONG WITH OTHER PEOPLE: VERY DIFFICULT
6. BECOMING EASILY ANNOYED OR IRRITABLE: NEARLY EVERY DAY

## 2017-05-31 ASSESSMENT — PATIENT HEALTH QUESTIONNAIRE - PHQ9: 5. POOR APPETITE OR OVEREATING: MORE THAN HALF THE DAYS

## 2017-05-31 NOTE — PROGRESS NOTES
"                                                         Outpatient Psychiatric Evaluation-Standard    Name: Juan Johnson  : 1990  Date: 2017    Source of Referral:  Primary Care Physician: No primary care provider on file.  Current Psychotherapist: MARIAN Serrano    Identifying Data:  Patient is a 26 year old, single male who presents for initial visit with me.  Patient is currently employed part time, [employer]. Patient attended the session alone.   60 minutes were spent on evaluation with 40 minutes CC time.    HPI:  Patient reports he has seen several providers, since age 17, for mental health due to \"sleeping a ton\" related to depression and anxiety. Patient reports several adequate trials of antidepressants which were ineffective. Patient reports social anxiety, especially in college, feeling judged by classmates. Patient is currently prescribed gabapentin 100 mg three times daily which is ineffective and buspirone (Buspar) 15 mg twice daily was started 2017 with no improvement in anxiety symptoms. Patient reports anxiety is primary issue. We discussed several medication options. After discussing risks and benefits, patient would like to proceed with bupropion (Wellbutrin). We discussed the concerns related to alcohol use in the context of taking an antidepressant. Patient verbalizes understanding.       Psychiatric Review of Symptoms:  Depression: Sleep: wakes 4-5 times per night takes 5 to 45 minutes to fall back asleep  Appetite: too much or not enough - weight is stable  Depressed Mood Interest: Decrease Energy: Decrease Concentration: Decrease    Last PHQ-9 score = No Value exists for the : HP#PHQ9; 12  Marian:  No symptoms  Mood Disorder Questionnaire:     Anxiety: Feeling nervous or on edge  Uncontrolled worrying  Trouble relaxing  Restlessness  Easily annoyed or irritable      GAD7 score: 12  Panic:  Sweating  Palpitations  Tremors  Shortness of Breath  Triggers: " talking to anyone in authority   difficulty articulating  Agoraphobia:  No  OCD:  No symptoms  Psychosis: No symptoms  ADD / ADHD: No symptoms  Gambling or shoplifting: Yes Gambling 2-3 times per week, no debt incurred  Eating Disorder:  No symptoms  Suicide attempts:  No  Current SI risk:  No Protective Factors: Family, friends    Significant Losses / Trauma / Abuse / Neglect Issues:  There are no indications or report of: significant losses, trauma, abuse or neglect.    PTSD:  No symptoms    Issues of possible neglect are not present.    A safety and risk management plan has not been developed at this time, however client was given the after-hours number / 911 should there be a change in any of these risk factors.      Psychiatric History:   Hospitalizations: None  Past psychotherapy: counseling and medication(s) from physician / PCP    Past medication trials: (patient was presented with a list to review all currently available antidepressants, mood stabilizers, tranquilizers, hypnotics and antipsychotics)  New Antidepressants:  Celexa (citalopram), Effexor (venlafaxine), Lexapro (escitalopram), Prozac (fluoxetine) and Zoloft (sertraline)  Tranquilizers:  Ativan (lorazepam) and Xanax (alprazolam)      Chemical Use History:  Patient has not received chemical dependency treatment in the past.  Patient reports no problems as a result of their drinking / drug use.  Current use of drugs or alcohol: alcohol - 2-3 times per week, consuming 2-6 beverages per time.  CAGE: C     Patient felt they ought to CUT down on your drinking (or drug use).  G     Patient felt bad or GUILTY about their drinking (or drug use).   Based on the positive Cage-Aid score and clinical interview there  are indications of drug or alcohol abuse. recommend chemical dependency evaluation - patient declines.  Tobacco use: No  Ready to quit?  No  NRT tried: NA    Past Medical History:  Surgery: History reviewed. No pertinent surgical  "history.  Allergies:  No Known Allergies  Primary MD: No primary care provider on file.  Seizures or head injury: Yes Hockey concussions with LOC at age 12, 13  Diet: \"Decent\"  Exercise: no regular exercise program  Supplements: none    Current Medications:  Current Outpatient Prescriptions   Medication Sig     busPIRone (BUSPAR) 15 MG tablet Take 1 tablet (15 mg) by mouth 2 times daily     gabapentin (NEURONTIN) 100 MG capsule Take 1 capsule (100 mg) by mouth 3 times daily     tadalafil (CIALIS) 20 MG tablet Take 0.5-1 tablets (10-20 mg) by mouth daily as needed for erectile dysfunction Never use with nitroglycerin, terazosin or doxazosin. (Patient not taking: Reported on 5/16/2017)     No current facility-administered medications for this visit.        Vital Signs:  BP (!) 155/99 (BP Location: Right arm, Patient Position: Chair, Cuff Size: Adult Regular)  Pulse 90  Temp 99  F (37.2  C) (Tympanic)  Wt 185 lb 12.8 oz (84.3 kg)  BMI 25.73 kg/m2      Review of Systems:  (constitutional, HEENT, Neuro, Cardiac, Pulmonary, GI, , Heme / Lymph, Endocrine, Skin / Breast, MSK reviewed)  10 point ROS was negative except for the following: Muscle tightness    Family History:   (with focus on psychiatric and substance abuse)  Chemical use problems: Maternal grandparents alcohol  Mental health history: Brother ADHD and see below  Patient reports family history includes Anxiety Disorder in his brother; Depression in his brother.    Social History:   Patient grew up in [place], [place]    Siblings: 3  Intact family growing up?; Yes  Highest education level was college graduate.   Marital status and living situation: Lives with roommate  zero children.   he has not been involved with the legal system.      Mental Status Assessment:     Appearance:  Well groomed      Behavior/relationship to examiner/demeanor:  Cooperative, engaged and pleasant  Motor activity:  Normal  Gait:  Normal   Speech:  Normal in volume, articulation, " "coherence   Mood (subjective report):  \"Unstable\"  Affect (objective appearance):  Mood congruent  Thought Process (Associations):  Logical, linear and goal directed  Thought content:  No evidence of suicidal or homicidal ideation,          no overt psychosis and                    patient does not appear to be responding to internal stimuli  Oriented to person, place, date/time   Attention Span and concentration: Intact   Memory:  Short-term memory intact and Long-term memory; Intact  Language:  Fluent   Fund of Knowledge/Intelligence:  Average  Use of language: Intact   Abstraction:  Normal  Insight:  Adequate  Judgment:  Adequate for safety    DSM5  Diagnosis:    296.32 Major Depressive Disorder, Recurrent Episode, Moderate _ and With anxious distress  300.23 (F40.10) Social Anxiety Disorder  300.02 (F41.1) Generalized Anxiety Disorder  Psychosocial & Contextual Factors: financial issues    Strengths and Liabilities:   Patient identified the following strengths or resources that will help him  succeed in counseling: friends / good social support and family support.  Things that may interfere with the patient's success include:financial hardship.    No flowsheet data found.      Impression:  Patient has tried several SSRIs and venlafaxine (Effexor) without benefit. Bupropion (Wellbutrin) due to side effect profile and patient report of symptoms. It is unclear if buspirone (Buspar) is helpful and is continued with plan to monitor. The dose of gabapentin is likely too low to be helpful.     Medication side effects and alternatives reviewed.     Treatment Plan:  Continue Buspirone (Buspar) 10 mg twice daily.   Begin bupropion (Wellbutrin)  mg in the morning.   Increase to gabapentin 300 mg three times daily or as needed.     Abstain from alcohol.     Follow up in one month.     - Recommend patient discuss medications with their pharmacist. Risks and benefits of medications discussed, including side effect " profile.   - Safety plan was reviewed; to the ER as needed or call after hours crisis line; [phone]  - Education and counseling was done regarding use of medications, psychotherapy options  - Call [phone] for appointment or to speak to a nurse.   -Office hours: Monday through Thursday 8:00 am to 4:30 pm; Friday 8:00 am to Noon.   - Patient was given a copy of this Treatment Plan today.     My Practice Policy was reviewed and signed: YES       Patient will continue to be seen for ongoing consultation and stabilization.      Signed: Esther Kang, RN, MS, APRN                 Psychiatry

## 2017-05-31 NOTE — PATIENT INSTRUCTIONS
Treatment Plan:  Continue Buspirone (Buspar) 10 mg twice daily.   Begin bupropion (Wellbutrin)  mg in the morning.   Increase to gabapentin 300 mg three times daily or as needed.     Follow up in one month.     - Recommend patient discuss medications with their pharmacist. Risks and benefits of medications discussed, including side effect profile.   - Safety plan was reviewed; to the ER as needed or call after hours crisis line; [phone]  - Education and counseling was done regarding use of medications, psychotherapy options  - Call [phone] for appointment or to speak to a nurse.   -Office hours: Monday through Thursday 8:00 am to 4:30 pm; Friday 8:00 am to Noon.   - Patient was given a copy of this Treatment Plan today.

## 2017-05-31 NOTE — MR AVS SNAPSHOT
After Visit Summary   5/31/2017    Juan Johnson    MRN: [id number]           Patient Information     Date Of Birth          1990        Visit Information        Provider Department      5/31/2017 2:45 PM Esther Kang APRN [facility]        Today's Diagnoses     Major depressive disorder, recurrent episode, moderate (H)    -  1    LAM (generalized anxiety disorder)        Social anxiety disorder          Care Instructions    Treatment Plan:  Continue Buspirone (Buspar) 10 mg twice daily.   Begin bupropion (Wellbutrin)  mg in the morning.   Increase to gabapentin 300 mg three times daily or as needed.     Follow up in one month.     - Recommend patient discuss medications with their pharmacist. Risks and benefits of medications discussed, including side effect profile.   - Safety plan was reviewed; to the ER as needed or call after hours crisis line; [phone]  - Education and counseling was done regarding use of medications, psychotherapy options  - Call [phone] for appointment or to speak to a nurse.   -Office hours: Monday through Thursday 8:00 am to 4:30 pm; Friday 8:00 am to Noon.   - Patient was given a copy of this Treatment Plan today.          Follow-ups after your visit        Your next 10 appointments already scheduled     Jun 06, 2017  3:00 PM CDT   Return Visit with MARIAN Patel   [facility] ([address] [id number] [phone]            Jun 21, 2017  3:00 PM CDT   Return Visit with MARIAN Patel   [facility]    [address] [address] [phone]              Who to contact     If you have questions or need follow up information about today's clinic visit or your schedule please contact [facility] directly at  "[phone].  Normal or non-critical lab and imaging results will be communicated to you by MyChart, letter or phone within 4 business days after the clinic has received the results. If you do not hear from us within 7 days, please contact the clinic through [employer] or phone. If you have a critical or abnormal lab result, we will notify you by phone as soon as possible.  Submit refill requests through [employer] or call your pharmacy and they will forward the refill request to us. Please allow 3 business days for your refill to be completed.          Additional Information About Your Visit        [employer] Information     [employer] lets you send messages to your doctor, view your test results, renew your prescriptions, schedule appointments and more. To sign up, go to [email or web address] . Click on \"Log in\" on the left side of the screen, which will take you to the Welcome page. Then click on \"Sign up Now\" on the right side of the page.     You will be asked to enter the access code listed below, as well as some personal information. Please follow the directions to create your username and password.     Your access code is: [email or web address]  Expires: 2017  3:00 PM     Your access code will  in 90 days. If you need help or a new code, please call your [facility] clinic or [phone].        Care EveryWhere ID     This is your Care EveryWhere ID. This could be used by other organizations to access your [facility] medical records  [id number]        Your Vitals Were     Pulse Temperature BMI (Body Mass Index)             90 99  F (37.2  C) (Tympanic) 25.73 kg/m2          Blood Pressure from Last 3 Encounters:   17 (!) 155/99   17 132/86   17 138/82    Weight from Last 3 Encounters:   17 185 lb 12.8 oz (84.3 kg)   17 185 lb 12.8 oz (84.3 kg)   17 188 lb 8 oz (85.5 kg)              Today, you had the following     No orders found for display         Today's Medication Changes       "    These changes are accurate as of: 5/31/17  3:28 PM.  If you have any questions, ask your nurse or doctor.               Start taking these medicines.        Dose/Directions    buPROPion 150 MG 24 hr tablet   Commonly known as:  WELLBUTRIN XL   Used for:  Social anxiety disorder, LAM (generalized anxiety disorder), Major depressive disorder, recurrent episode, moderate (H)   Started by:  Esther Kang APRN CNS        Dose:  150 mg   Take 1 tablet (150 mg) by mouth every morning   Quantity:  30 tablet   Refills:  1         These medicines have changed or have updated prescriptions.        Dose/Directions    busPIRone 15 MG tablet   Commonly known as:  BUSPAR   This may have changed:  Another medication with the same name was removed. Continue taking this medication, and follow the directions you see here.   Used for:  LAM (generalized anxiety disorder)   Changed by:  Cathi Montes De Oca APRN CNP        Dose:  15 mg   Take 1 tablet (15 mg) by mouth 2 times daily   Quantity:  60 tablet   Refills:  3            Where to get your medicines      These medications were sent to [facility]/pharmacy #[id number] [address] AT [facility] OF [address] [place] [id number]     Phone:  [phone]     buPROPion 150 MG 24 hr tablet                Primary Care Provider    None Specified       No primary provider on file.        Thank you!     Thank you for choosing [facility]  for your care. Our goal is always to provide you with excellent care. Hearing back from our patients is one way we can continue to improve our services. Please take a few minutes to complete the written survey that you may receive in the mail after your visit with us. Thank you!             Your Updated Medication List - Protect others around you: Learn how to safely use, store and throw away your medicines at www.disposemymeds.org.          This list is accurate as of: 5/31/17  3:28 PM.  Always use your  most recent med list.                   Brand Name Dispense Instructions for use    buPROPion 150 MG 24 hr tablet    WELLBUTRIN XL    30 tablet    Take 1 tablet (150 mg) by mouth every morning       busPIRone 15 MG tablet    BUSPAR    60 tablet    Take 1 tablet (15 mg) by mouth 2 times daily       gabapentin 100 MG capsule    NEURONTIN    45 capsule    Take 1 capsule (100 mg) by mouth 3 times daily       tadalafil 20 MG tablet    CIALIS    6 tablet    Take 0.5-1 tablets (10-20 mg) by mouth daily as needed for erectile dysfunction Never use with nitroglycerin, terazosin or doxazosin.

## 2017-06-01 ASSESSMENT — PATIENT HEALTH QUESTIONNAIRE - PHQ9: SUM OF ALL RESPONSES TO PHQ QUESTIONS 1-9: 12

## 2017-06-01 ASSESSMENT — ANXIETY QUESTIONNAIRES: GAD7 TOTAL SCORE: 12

## 2017-12-06 ENCOUNTER — TELEPHONE (OUTPATIENT)
Dept: FAMILY MEDICINE | Facility: CLINIC | Age: 27
End: 2017-12-06

## 2017-12-06 DIAGNOSIS — F33.0 MAJOR DEPRESSIVE DISORDER, RECURRENT EPISODE, MILD WITH ANXIOUS DISTRESS (H): Primary | ICD-10-CM

## 2017-12-06 NOTE — LETTER
December 6, 2017      Juan Johnson  [address]  [place] [address]        Dear Juan    In order to ensure we are providing the best quality care, we have reviewed your chart and see that you are due for:    1. PHQ9 Screening  2.Depression Action Screening    Please call the clinic at your earliest convenience to schedule an appointment.  If you have completed these please contact our office via phone at [phone] or [email or web address] to update our records.  We would like to know the date (approximately month and year), the result, and ideally where the procedure was performed.    Thank you for trusting us with your health care.      Sincerely,    Care Team for Cathi Montes De Oca CNP

## 2017-12-06 NOTE — TELEPHONE ENCOUNTER
Panel Management Review      Patient has the following on his problem list:     Depression / Dysthymia review    Measure:  Needs PHQ-9 score of 4 or less during index window.  Administer PHQ-9 and if score is 5 or more, send encounter to provider for next steps.    5 - 7 month window range: yes    PHQ-9 SCORE 4/25/2017 5/17/2017 5/31/2017   Total Score 19 9 12       If PHQ-9 recheck is 5 or more, route to provider for next steps.    Patient is due for:  PHQ9 and DAP        Composite cancer screening  Chart review shows that this patient is due/due soon for the following None  Summary:    Patient is due/failing the following:   DAP and PHQ9    Action needed:   Patient needs to do ACT. and Patient needs to do PHQ9.    Type of outreach:    Sent letter.    Questions for provider review:    None                                                                                                                                    Saud Ortiz     Chart routed to none .

## 2017-12-06 NOTE — LETTER
My Depression Action Plan  Name: Juan Johnson   Date of Birth 1990  Date: 12/6/2017    My doctor: No primary care provider on file.   My clinic: [address] [address] [phone]          GREEN    ZONE   Good Control    What it looks like:     Things are going generally well. You have normal up s and down s. You may even feel depressed from time to time, but bad moods usually last less than a day.   What you need to do:  1. Continue to care for yourself (see self care plan)  2. Check your depression survival kit and update it as needed  3. Follow your physician s recommendations including any medication.  4. Do not stop taking medication unless you consult with your physician first.           YELLOW         ZONE Getting Worse    What it looks like:     Depression is starting to interfere with your life.     It may be hard to get out of bed; you may be starting to isolate yourself from others.    Symptoms of depression are starting to last most all day and this has happened for several days.     You may have suicidal thoughts but they are not constant.   What you need to do:     1. Call your care team, your response to treatment will improve if you keep your care team informed of your progress. Yellow periods are signs an adjustment may need to be made.     2. Continue your self-care, even if you have to fake it!    3. Talk to someone in your support network    4. Open up your depression survival kit           RED    ZONE Medical Alert - Get Help    What it looks like:     Depression is seriously interfering with your life.     You may experience these or other symptoms: You can t get out of bed most days, can t work or engage in other necessary activities, you have trouble taking care of basic hygiene, or basic responsibilities, thoughts of suicide or death that will not go away, self-injurious behavior.     What you need  to do:  1. Call your care team and request a same-day appointment. If they are not available (weekends or after hours) call your local crisis line, emergency room or 911.      Electronically signed by: Saud Ortiz, December 6, 2017    Depression Self Care Plan / Survival Kit    Self-Care for Depression  Here s the deal. Your body and mind are really not as separate as most people think.  What you do and think affects how you feel and how you feel influences what you do and think. This means if you do things that people who feel good do, it will help you feel better.  Sometimes this is all it takes.  There is also a place for medication and therapy depending on how severe your depression is, so be sure to consult with your medical provider and/ or Behavioral Health Consultant if your symptoms are worsening or not improving.     In order to better manage my stress, I will:    Exercise  Get some form of exercise, every day. This will help reduce pain and release endorphins, the  feel good  chemicals in your brain. This is almost as good as taking antidepressants!  This is not the same as joining a gym and then never going! (they count on that by the way ) It can be as simple as just going for a walk or doing some gardening, anything that will get you moving.      Hygiene   Maintain good hygiene (Get out of bed in the morning, Make your bed, Brush your teeth, Take a shower, and Get dressed like you were going to work, even if you are unemployed).  If your clothes don't fit try to get ones that do.    Diet  I will strive to eat foods that are good for me, drink plenty of water, and avoid excessive sugar, caffeine, alcohol, and other mood-altering substances.  Some foods that are helpful in depression are: complex carbohydrates, B vitamins, flaxseed, fish or fish oil, fresh fruits and vegetables.    Psychotherapy  I agree to participate in Individual Therapy (if recommended).    Medication  If prescribed medications, I  agree to take them.  Missing doses can result in serious side effects.  I understand that drinking alcohol, or other illicit drug use, may cause potential side effects.  I will not stop my medication abruptly without first discussing it with my provider.    Staying Connected With Others  I will stay in touch with my friends, family members, and my primary care provider/team.    Use your imagination  Be creative.  We all have a creative side; it doesn t matter if it s oil painting, sand castles, or mud pies! This will also kick up the endorphins.    Witness Beauty  (AKA stop and smell the roses) Take a look outside, even in mid-winter. Notice colors, textures. Watch the squirrels and birds.     Service to others  Be of service to others.  There is always someone else in need.  By helping others we can  get out of ourselves  and remember the really important things.  This also provides opportunities for practicing all the other parts of the program.    Humor  Laugh and be silly!  Adjust your TV habits for less news and crime-drama and more comedy.    Control your stress  Try breathing deep, massage therapy, biofeedback, and meditation. Find time to relax each day.     My support system    Clinic Contact:  Phone number:    Contact 1:  Phone number:    Contact 2:  Phone number:    Mandaeism/:  Phone number:    Therapist:  Phone number:    Local crisis center:    Phone number:    Other community support:  Phone number:

## 2021-11-13 NOTE — PROGRESS NOTES
SUBJECTIVE:   CC: Juan Johnson is an 31 year old male who presents for preventative health visit.     31-year-old gentleman comes in for a preventive physical examination. He is requesting EKG. He is getting treatment for ADHD through telehealth with behavioral health nurse practitioner. An EKG was requested by the provider. Patient has no shortness of breath, chest pain, palpitation or syncope. He has ADHD since school but his parents do not believe in treatment at that time. Mostly inattentive type. Currently taking Adderall 7.5 mg twice a day. Based on history, it is unclear to me if he has had a formal assessment by a psychologist.    Indicates that he is a . Taking Adderall has helped in his work. He declined a flu shot today but agreed to booster COVID-19 vaccine.    Patient has been advised of split billing requirements and indicates understanding: Yes  Healthy Habits:     Getting at least 3 servings of Calcium per day:  Yes    Bi-annual eye exam:  NO    Dental care twice a year:  NO    Sleep apnea or symptoms of sleep apnea:  None    Diet:  Regular (no restrictions)    Frequency of exercise:  1 day/week    Duration of exercise:  15-30 minutes    Taking medications regularly:  Yes    Medication side effects:  None    PHQ-2 Total Score: 3    Additional concerns today:  No              Today's PHQ-2 Score:   PHQ-2 ( 1999 [employer]) 4/25/2017   Q1: Little interest or pleasure in doing things 2   Q2: Feeling down, depressed or hopeless 2   PHQ-2 Score 4       Abuse: Current or Past(Physical, Sexual or Emotional)- No  Do you feel safe in your environment? Yes    Have you ever done Advance Care Planning? (For example, a Health Directive, POLST, or a discussion with a medical provider or your loved ones about your wishes): No, advance care planning information given to patient to review.  Advanced care planning was discussed at today's visit.    Social History     Tobacco Use     Smoking  status: Former Smoker     Packs/day: 0.25     Years: 4.00     Pack years: 1.00     Types: Cigarettes     Quit date: 2017     Years since quittin.4     Smokeless tobacco: Not on file   Substance Use Topics     Alcohol use: Yes     Comment: about 2 per day or 8 per week           Last PSA: No results found for: PSA    Reviewed orders with patient. Reviewed health maintenance and updated orders accordingly - Yes  BP Readings from Last 3 Encounters:   21 (!) 145/93   17 (!) 155/99   17 132/86    Wt Readings from Last 3 Encounters:   21 95.9 kg (211 lb 6.4 oz)   17 84.3 kg (185 lb 12.8 oz)   17 84.3 kg (185 lb 12.8 oz)                  Patient Active Problem List   Diagnosis     LAM (generalized anxiety disorder)     Major depressive disorder, recurrent episode, mild with anxious distress (H)     Attention deficit hyperactivity disorder (ADHD), predominantly inattentive type     Past Surgical History:   Procedure Laterality Date     TONSILLECTOMY & ADENOIDECTOMY Bilateral        Social History     Tobacco Use     Smoking status: Former Smoker     Packs/day: 0.25     Years: 4.00     Pack years: 1.00     Types: Cigarettes     Start date:      Quit date:      Years since quittin.8     Smokeless tobacco: Not on file   Substance Use Topics     Alcohol use: Yes     Alcohol/week: 6.0 standard drinks     Types: 6 Cans of beer per week     Comment: about 2 per day or 8 per week     Family History   Problem Relation Age of Onset     Cancer Maternal Grandmother      Cerebrovascular Disease Maternal Grandfather          Current Outpatient Medications   Medication Sig Dispense Refill     amphetamine-dextroamphetamine (ADDERALL) 7.5 MG tablet Take 1 tablet (7.5 mg) by mouth 2 times daily 10 tablet 0     No Known Allergies    Reviewed and updated as needed this visit by clinical staff                Reviewed and updated as needed this visit by Provider               Past  Medical History:   Diagnosis Date     Attention deficit hyperactivity disorder (ADHD), predominantly inattentive type 11/18/2021     LAM (generalized anxiety disorder) 2007     Major depressive disorder with single episode, in full remission (H) 2007      Past Surgical History:   Procedure Laterality Date     TONSILLECTOMY & ADENOIDECTOMY Bilateral 1997       Review of Systems   Constitutional: Negative for chills and fever.   HENT: Negative for congestion, ear pain, hearing loss and sore throat.    Eyes: Negative for pain and visual disturbance.   Respiratory: Negative for cough and shortness of breath.    Cardiovascular: Negative for chest pain, palpitations and peripheral edema.   Gastrointestinal: Negative for abdominal pain, constipation, diarrhea, heartburn, hematochezia and nausea.   Genitourinary: Negative for dysuria, frequency, genital sores, hematuria, impotence, penile discharge and urgency.   Musculoskeletal: Negative for arthralgias, joint swelling and myalgias.   Skin: Negative for rash.   Neurological: Negative for dizziness, weakness, headaches and paresthesias.   Psychiatric/Behavioral: Negative for mood changes. The patient is not nervous/anxious.      CONSTITUTIONAL: NEGATIVE for fever, chills, change in weight  INTEGUMENTARY/SKIN: NEGATIVE for worrisome rashes, moles or lesions  EYES: NEGATIVE for vision changes or irritation  ENT: NEGATIVE for ear, mouth and throat problems  RESP: NEGATIVE for significant cough or SOB  CV: NEGATIVE for chest pain, palpitations or peripheral edema  GI: NEGATIVE for nausea, abdominal pain, heartburn, or change in bowel habits   male: negative for dysuria, hematuria, decreased urinary stream, erectile dysfunction, urethral discharge  MUSCULOSKELETAL: NEGATIVE for significant arthralgias or myalgia  NEURO: NEGATIVE for weakness, dizziness or paresthesias  ENDOCRINE: NEGATIVE for temperature intolerance, skin/hair changes  HEME/ALLERGY/IMMUNE: NEGATIVE for  bleeding problems  PSYCHIATRIC: NEGATIVE for changes in mood or affect    OBJECTIVE:   There were no vitals taken for this visit.    Physical Exam  GENERAL: healthy, alert and no distress  EYES: Eyes grossly normal to inspection, PERRL and conjunctivae and sclerae normal  HENT: ear canals and TM's normal, nose and mouth without ulcers or lesions  NECK: no adenopathy, no asymmetry, masses, or scars and thyroid normal to palpation  RESP: lungs clear to auscultation - no rales, rhonchi or wheezes  CV: regular rate and rhythm, normal S1 S2, no S3 or S4, no murmur, click or rub, no peripheral edema and peripheral pulses strong  ABDOMEN: soft, nontender, no hepatosplenomegaly, no masses and bowel sounds normal   (male): normal male genitalia without lesions or urethral discharge, no hernia  RECTAL: normal sphincter tone, no rectal masses, prostate normal size, smooth, nontender without nodules or masses  MS: no gross musculoskeletal defects noted, no edema  SKIN: no suspicious lesions or rashes  NEURO: Normal strength and tone, mentation intact and speech normal  PSYCH: mentation appears normal, affect normal/bright  LYMPH: no cervical, supraclavicular, axillary, or inguinal adenopathy    Diagnostic Test Results:  Labs reviewed in Epic    EKG performed today shows normal sinus rhythm.  I reviewed it.  Fasting blood sugar lipids ordered and pending.    ASSESSMENT/PLAN:     1.  Routine general physical examination completed and is good.  EKG performed today showed normal sinus rhythm.  2.  ADHD being treated by outside provider.  3.  Screening for diabetes and lipid disorder.  We will get back to the results.  4.  Booster shot for COVID-19 vaccine provided-better now.  5.  Patient declined flu vaccine.        Patient has been advised of split billing requirements and indicates understanding: Yes  COUNSELING:   Reviewed preventive health counseling, as reflected in patient instructions       Regular exercise       Healthy  "diet/nutrition    Estimated body mass index is 25.73 kg/m  as calculated from the following:    Height as of 4/25/17: 1.81 m (5' 11.25\").    Weight as of 5/31/17: 84.3 kg (185 lb 12.8 oz).     Weight management plan: Discussed healthy diet and exercise guidelines    He reports that he quit smoking about 4 years ago. His smoking use included cigarettes. He has a 1.00 pack-year smoking history. He does not have any smokeless tobacco history on file.      Counseling Resources:  ATP IV Guidelines  Pooled Cohorts Equation Calculator  FRAX Risk Assessment  ICSI Preventive Guidelines  Dietary Guidelines for Americans, 2010  [email or web address]'s MyPlate  ASA Prophylaxis  Lung CA Screening    Aniket Burks MD  [facility]  Answers for HPI/ROS submitted by the patient on 11/18/2021  If you checked off any problems, how difficult have these problems made it for you to do your work, take care of things at home, or get along with other people?: Very difficult  PHQ9 TOTAL SCORE: 12  LAM 7 TOTAL SCORE: 8      Answers for HPI/ROS submitted by the patient on 11/18/2021  If you checked off any problems, how difficult have these problems made it for you to do your work, take care of things at home, or get along with other people?: Very difficult  PHQ9 TOTAL SCORE: 12  LAM 7 TOTAL SCORE: 8      "

## 2021-11-18 ENCOUNTER — OFFICE VISIT (OUTPATIENT)
Dept: FAMILY MEDICINE | Facility: CLINIC | Age: 31
End: 2021-11-18
Payer: COMMERCIAL

## 2021-11-18 VITALS
WEIGHT: 211.4 LBS | RESPIRATION RATE: 16 BRPM | DIASTOLIC BLOOD PRESSURE: 93 MMHG | SYSTOLIC BLOOD PRESSURE: 145 MMHG | HEIGHT: 73 IN | TEMPERATURE: 98.1 F | HEART RATE: 92 BPM | BODY MASS INDEX: 28.02 KG/M2 | OXYGEN SATURATION: 98 %

## 2021-11-18 DIAGNOSIS — Z00.00 ROUTINE GENERAL MEDICAL EXAMINATION AT A HEALTH CARE FACILITY: Primary | ICD-10-CM

## 2021-11-18 DIAGNOSIS — F90.0 ATTENTION DEFICIT HYPERACTIVITY DISORDER (ADHD), PREDOMINANTLY INATTENTIVE TYPE: ICD-10-CM

## 2021-11-18 DIAGNOSIS — Z13.1 SCREENING FOR DIABETES MELLITUS: ICD-10-CM

## 2021-11-18 DIAGNOSIS — Z13.220 SCREENING FOR LIPID DISORDERS: ICD-10-CM

## 2021-11-18 DIAGNOSIS — Z23 HIGH PRIORITY FOR 2019-NCOV VACCINE: ICD-10-CM

## 2021-11-18 LAB
CHOLEST SERPL-MCNC: 247 MG/DL
FASTING STATUS PATIENT QL REPORTED: YES
FASTING STATUS PATIENT QL REPORTED: YES
GLUCOSE BLD-MCNC: 111 MG/DL (ref 70–99)
HDLC SERPL-MCNC: 53 MG/DL
LDLC SERPL CALC-MCNC: 169 MG/DL
NONHDLC SERPL-MCNC: 194 MG/DL
TRIGL SERPL-MCNC: 126 MG/DL

## 2021-11-18 PROCEDURE — 36415 COLL VENOUS BLD VENIPUNCTURE: CPT | Performed by: INTERNAL MEDICINE

## 2021-11-18 PROCEDURE — 0064A COVID-19,PF,MODERNA (18+ YRS BOOSTER .25ML): CPT | Performed by: INTERNAL MEDICINE

## 2021-11-18 PROCEDURE — 82947 ASSAY GLUCOSE BLOOD QUANT: CPT | Performed by: INTERNAL MEDICINE

## 2021-11-18 PROCEDURE — 80061 LIPID PANEL: CPT | Performed by: INTERNAL MEDICINE

## 2021-11-18 PROCEDURE — 91306 COVID-19,PF,MODERNA (18+ YRS BOOSTER .25ML): CPT | Performed by: INTERNAL MEDICINE

## 2021-11-18 PROCEDURE — 99385 PREV VISIT NEW AGE 18-39: CPT | Mod: 25 | Performed by: INTERNAL MEDICINE

## 2021-11-18 PROCEDURE — 93000 ELECTROCARDIOGRAM COMPLETE: CPT | Performed by: INTERNAL MEDICINE

## 2021-11-18 RX ORDER — DEXTROAMPHETAMINE SACCHARATE, AMPHETAMINE ASPARTATE, DEXTROAMPHETAMINE SULFATE AND AMPHETAMINE SULFATE 1.875; 1.875; 1.875; 1.875 MG/1; MG/1; MG/1; MG/1
7.5 TABLET ORAL 2 TIMES DAILY
Qty: 10 TABLET | Refills: 0 | COMMUNITY
Start: 2021-11-18

## 2021-11-18 ASSESSMENT — ENCOUNTER SYMPTOMS
DYSURIA: 0
HEARTBURN: 0
COUGH: 0
DIARRHEA: 0
ARTHRALGIAS: 0
NAUSEA: 0
WEAKNESS: 0
PALPITATIONS: 0
CHILLS: 0
MYALGIAS: 0
HEADACHES: 0
DIZZINESS: 0
FEVER: 0
PARESTHESIAS: 0
HEMATOCHEZIA: 0
SORE THROAT: 0
NERVOUS/ANXIOUS: 0
JOINT SWELLING: 0
HEMATURIA: 0
FREQUENCY: 0
ABDOMINAL PAIN: 0
SHORTNESS OF BREATH: 0
CONSTIPATION: 0
EYE PAIN: 0

## 2021-11-18 ASSESSMENT — ANXIETY QUESTIONNAIRES
6. BECOMING EASILY ANNOYED OR IRRITABLE: MORE THAN HALF THE DAYS
GAD7 TOTAL SCORE: 8
GAD7 TOTAL SCORE: 8
7. FEELING AFRAID AS IF SOMETHING AWFUL MIGHT HAPPEN: NOT AT ALL
GAD7 TOTAL SCORE: 8
4. TROUBLE RELAXING: SEVERAL DAYS
1. FEELING NERVOUS, ANXIOUS, OR ON EDGE: SEVERAL DAYS
2. NOT BEING ABLE TO STOP OR CONTROL WORRYING: SEVERAL DAYS
8. IF YOU CHECKED OFF ANY PROBLEMS, HOW DIFFICULT HAVE THESE MADE IT FOR YOU TO DO YOUR WORK, TAKE CARE OF THINGS AT HOME, OR GET ALONG WITH OTHER PEOPLE?: SOMEWHAT DIFFICULT
3. WORRYING TOO MUCH ABOUT DIFFERENT THINGS: SEVERAL DAYS
5. BEING SO RESTLESS THAT IT IS HARD TO SIT STILL: MORE THAN HALF THE DAYS
7. FEELING AFRAID AS IF SOMETHING AWFUL MIGHT HAPPEN: NOT AT ALL

## 2021-11-18 ASSESSMENT — MIFFLIN-ST. JEOR: SCORE: 1954.84

## 2021-11-18 ASSESSMENT — PATIENT HEALTH QUESTIONNAIRE - PHQ9
10. IF YOU CHECKED OFF ANY PROBLEMS, HOW DIFFICULT HAVE THESE PROBLEMS MADE IT FOR YOU TO DO YOUR WORK, TAKE CARE OF THINGS AT HOME, OR GET ALONG WITH OTHER PEOPLE: VERY DIFFICULT
SUM OF ALL RESPONSES TO PHQ QUESTIONS 1-9: 12
SUM OF ALL RESPONSES TO PHQ QUESTIONS 1-9: 12

## 2021-11-19 ASSESSMENT — ANXIETY QUESTIONNAIRES: GAD7 TOTAL SCORE: 8

## 2021-11-19 ASSESSMENT — PATIENT HEALTH QUESTIONNAIRE - PHQ9: SUM OF ALL RESPONSES TO PHQ QUESTIONS 1-9: 12

## 2021-12-04 ENCOUNTER — HEALTH MAINTENANCE LETTER (OUTPATIENT)
Age: 31
End: 2021-12-04

## 2022-06-09 ENCOUNTER — TELEPHONE (OUTPATIENT)
Dept: FAMILY MEDICINE | Facility: CLINIC | Age: 32
End: 2022-06-09
Payer: COMMERCIAL

## 2022-06-09 NOTE — TELEPHONE ENCOUNTER
Patient Quality Outreach    Patient is due for the following:   Depression  -  PHQ-9 Needed    NEXT STEPS:   No follow up needed at this time.    Type of outreach:    Sent MyChart message.    Next Steps:  Reach out within 90 days via Phone and MyChart.    Max number of attempts reached: No. Will try again in 90 days if patient still on fail list.    Questions for provider review:    None     Porsche Cortez CMA  Chart routed to Provider.

## 2022-09-11 ENCOUNTER — HEALTH MAINTENANCE LETTER (OUTPATIENT)
Age: 32
End: 2022-09-11

## 2023-01-23 ENCOUNTER — HEALTH MAINTENANCE LETTER (OUTPATIENT)
Age: 33
End: 2023-01-23

## 2024-02-24 ENCOUNTER — HEALTH MAINTENANCE LETTER (OUTPATIENT)
Age: 34
End: 2024-02-24

## 2025-07-23 ENCOUNTER — TRANSCRIBE ORDERS (OUTPATIENT)
Dept: OTHER | Age: 35
End: 2025-07-23

## 2025-07-23 DIAGNOSIS — M54.9 UPPER BACK PAIN: ICD-10-CM

## 2025-07-23 DIAGNOSIS — M54.9 BACK PAIN, UNSPECIFIED BACK LOCATION, UNSPECIFIED BACK PAIN LATERALITY, UNSPECIFIED CHRONICITY: ICD-10-CM

## 2025-07-23 DIAGNOSIS — M25.373 INSTABILITY OF ANKLE, UNSPECIFIED LATERALITY: Primary | ICD-10-CM
